# Patient Record
Sex: MALE | URBAN - METROPOLITAN AREA
[De-identification: names, ages, dates, MRNs, and addresses within clinical notes are randomized per-mention and may not be internally consistent; named-entity substitution may affect disease eponyms.]

---

## 2021-01-13 ENCOUNTER — OFFICE VISIT (OUTPATIENT)
Dept: OPHTHALMOLOGY | Facility: CLINIC | Age: 26
End: 2021-01-13
Payer: COMMERCIAL

## 2021-01-13 DIAGNOSIS — H52.03 HYPERMETROPIA OF BOTH EYES: ICD-10-CM

## 2021-01-13 DIAGNOSIS — H51.11 CONVERGENCE INSUFFICIENCY: Primary | ICD-10-CM

## 2021-01-13 PROCEDURE — 92004 COMPRE OPH EXAM NEW PT 1/>: CPT | Performed by: OPTOMETRIST

## 2021-01-13 PROCEDURE — 92015 DETERMINE REFRACTIVE STATE: CPT | Performed by: OPTOMETRIST

## 2021-01-13 ASSESSMENT — REFRACTION_WEARINGRX
OS_SPHERE: +0.50
OD_SPHERE: +0.50
SPECS_TYPE: SVL - READING
OD_CYLINDER: SPHERE
OS_CYLINDER: SPHERE
OD_HBASE: IN
OD_HPRISM: 2.0
OS_HBASE: IN
OS_HPRISM: 2.0

## 2021-01-13 ASSESSMENT — VISUAL ACUITY
OD_SC: 20/25
OD_SC: 20/20
OS_SC: 20/25
OS_SC: 20/20
METHOD: SNELLEN - LINEAR
OD_CC: J1+
OS_CC: J1+
CORRECTION_TYPE: GLASSES
METHOD_MR_RETINOSCOPY: 1

## 2021-01-13 ASSESSMENT — REFRACTION_FINALRX
OD_HBASE: IN
OS_HPRISM: 2.0
OS_HBASE: IN
OD_HPRISM: 2.0

## 2021-01-13 ASSESSMENT — REFRACTION_MANIFEST
OD_SPHERE: +0.50
OS_SPHERE: +0.25
OD_CYLINDER: SPHERE
OS_CYLINDER: SPHERE

## 2021-01-13 ASSESSMENT — CONF VISUAL FIELD
METHOD: COUNTING FINGERS
OD_NORMAL: 1
OS_NORMAL: 1

## 2021-01-13 ASSESSMENT — SLIT LAMP EXAM - LIDS
COMMENTS: NORMAL
COMMENTS: NORMAL

## 2021-01-13 ASSESSMENT — EXTERNAL EXAM - LEFT EYE: OS_EXAM: NORMAL

## 2021-01-13 ASSESSMENT — CUP TO DISC RATIO
OD_RATIO: 0.2
OS_RATIO: 0.2

## 2021-01-13 ASSESSMENT — EXTERNAL EXAM - RIGHT EYE: OD_EXAM: NORMAL

## 2021-01-13 ASSESSMENT — TONOMETRY
IOP_METHOD: ICARE
OS_IOP_MMHG: 14
OD_IOP_MMHG: 16

## 2021-01-13 NOTE — NURSING NOTE
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     Sapna LAWRENCE January 13, 2021 10:31 AM

## 2021-01-13 NOTE — PROGRESS NOTES
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 Return to clinic with adaptation difficulties or new/worsening blurred vision.     (H52.03) Hypermetropia of both eyes  Plan: REFRACTION [85679]        See above note.       Complete documentation of historical and exam elements from today's encounter can  be found in the full encounter summary report (not reduplicated in this progress  note). I personally obtained the chief complaint(s) and history of present illness. I  confirmed and edited as necessary the review of systems, past medical/surgical  history, family history, social history, and examination findings as documented by  others; and I examined the patient myself. I personally reviewed the relevant tests,  images, and reports as documented above. I formulated and edited as necessary the  assessment and plan and discussed the findings and management plan with the  patient and family.    Sascha Disla, OD